# Patient Record
Sex: MALE | Race: BLACK OR AFRICAN AMERICAN | NOT HISPANIC OR LATINO | URBAN - METROPOLITAN AREA
[De-identification: names, ages, dates, MRNs, and addresses within clinical notes are randomized per-mention and may not be internally consistent; named-entity substitution may affect disease eponyms.]

---

## 2022-04-03 ENCOUNTER — EMERGENCY (EMERGENCY)
Facility: HOSPITAL | Age: 27
LOS: 1 days | Discharge: HOME | End: 2022-04-03
Attending: EMERGENCY MEDICINE | Admitting: EMERGENCY MEDICINE
Payer: COMMERCIAL

## 2022-04-03 VITALS
DIASTOLIC BLOOD PRESSURE: 60 MMHG | HEART RATE: 86 BPM | OXYGEN SATURATION: 100 % | SYSTOLIC BLOOD PRESSURE: 156 MMHG | RESPIRATION RATE: 16 BRPM | TEMPERATURE: 98 F

## 2022-04-03 LAB
ALBUMIN SERPL ELPH-MCNC: 4.9 G/DL — SIGNIFICANT CHANGE UP (ref 3.3–5)
ALP SERPL-CCNC: 65 U/L — SIGNIFICANT CHANGE UP (ref 40–120)
ALT FLD-CCNC: 16 U/L — SIGNIFICANT CHANGE UP (ref 4–41)
ANION GAP SERPL CALC-SCNC: 12 MMOL/L — SIGNIFICANT CHANGE UP (ref 7–14)
AST SERPL-CCNC: 18 U/L — SIGNIFICANT CHANGE UP (ref 4–40)
BASOPHILS # BLD AUTO: 0.05 K/UL — SIGNIFICANT CHANGE UP (ref 0–0.2)
BASOPHILS NFR BLD AUTO: 0.6 % — SIGNIFICANT CHANGE UP (ref 0–2)
BILIRUB SERPL-MCNC: 0.6 MG/DL — SIGNIFICANT CHANGE UP (ref 0.2–1.2)
BUN SERPL-MCNC: 20 MG/DL — SIGNIFICANT CHANGE UP (ref 7–23)
CALCIUM SERPL-MCNC: 9.8 MG/DL — SIGNIFICANT CHANGE UP (ref 8.4–10.5)
CHLORIDE SERPL-SCNC: 100 MMOL/L — SIGNIFICANT CHANGE UP (ref 98–107)
CK SERPL-CCNC: 106 U/L — SIGNIFICANT CHANGE UP (ref 30–200)
CO2 SERPL-SCNC: 30 MMOL/L — SIGNIFICANT CHANGE UP (ref 22–31)
CREAT SERPL-MCNC: 1.25 MG/DL — SIGNIFICANT CHANGE UP (ref 0.5–1.3)
CRP SERPL-MCNC: <4 MG/L — SIGNIFICANT CHANGE UP
EGFR: 82 ML/MIN/1.73M2 — SIGNIFICANT CHANGE UP
EOSINOPHIL # BLD AUTO: 0.05 K/UL — SIGNIFICANT CHANGE UP (ref 0–0.5)
EOSINOPHIL NFR BLD AUTO: 0.6 % — SIGNIFICANT CHANGE UP (ref 0–6)
ERYTHROCYTE [SEDIMENTATION RATE] IN BLOOD: 1 MM/HR — SIGNIFICANT CHANGE UP (ref 1–15)
GLUCOSE SERPL-MCNC: 95 MG/DL — SIGNIFICANT CHANGE UP (ref 70–99)
HCT VFR BLD CALC: 45.3 % — SIGNIFICANT CHANGE UP (ref 39–50)
HGB BLD-MCNC: 15 G/DL — SIGNIFICANT CHANGE UP (ref 13–17)
HIV 1+2 AB+HIV1 P24 AG SERPL QL IA: SIGNIFICANT CHANGE UP
IANC: 5.95 K/UL — SIGNIFICANT CHANGE UP (ref 1.8–7.4)
IMM GRANULOCYTES NFR BLD AUTO: 0.4 % — SIGNIFICANT CHANGE UP (ref 0–1.5)
LYMPHOCYTES # BLD AUTO: 1.36 K/UL — SIGNIFICANT CHANGE UP (ref 1–3.3)
LYMPHOCYTES # BLD AUTO: 16.4 % — SIGNIFICANT CHANGE UP (ref 13–44)
MCHC RBC-ENTMCNC: 30.2 PG — SIGNIFICANT CHANGE UP (ref 27–34)
MCHC RBC-ENTMCNC: 33.1 GM/DL — SIGNIFICANT CHANGE UP (ref 32–36)
MCV RBC AUTO: 91.3 FL — SIGNIFICANT CHANGE UP (ref 80–100)
MONOCYTES # BLD AUTO: 0.86 K/UL — SIGNIFICANT CHANGE UP (ref 0–0.9)
MONOCYTES NFR BLD AUTO: 10.4 % — SIGNIFICANT CHANGE UP (ref 2–14)
NEUTROPHILS # BLD AUTO: 5.95 K/UL — SIGNIFICANT CHANGE UP (ref 1.8–7.4)
NEUTROPHILS NFR BLD AUTO: 71.6 % — SIGNIFICANT CHANGE UP (ref 43–77)
NRBC # BLD: 0 /100 WBCS — SIGNIFICANT CHANGE UP
NRBC # FLD: 0 K/UL — SIGNIFICANT CHANGE UP
PLATELET # BLD AUTO: 216 K/UL — SIGNIFICANT CHANGE UP (ref 150–400)
POTASSIUM SERPL-MCNC: 4 MMOL/L — SIGNIFICANT CHANGE UP (ref 3.5–5.3)
POTASSIUM SERPL-SCNC: 4 MMOL/L — SIGNIFICANT CHANGE UP (ref 3.5–5.3)
PROT ?TM UR-MCNC: 8 MG/DL — SIGNIFICANT CHANGE UP
PROT SERPL-MCNC: 7.4 G/DL — SIGNIFICANT CHANGE UP (ref 6–8.3)
PROT SERPL-MCNC: 7.6 G/DL — SIGNIFICANT CHANGE UP (ref 6–8.3)
RBC # BLD: 4.96 M/UL — SIGNIFICANT CHANGE UP (ref 4.2–5.8)
RBC # FLD: 13 % — SIGNIFICANT CHANGE UP (ref 10.3–14.5)
SARS-COV-2 RNA SPEC QL NAA+PROBE: SIGNIFICANT CHANGE UP
SODIUM SERPL-SCNC: 142 MMOL/L — SIGNIFICANT CHANGE UP (ref 135–145)
TSH SERPL-MCNC: 0.86 UIU/ML — SIGNIFICANT CHANGE UP (ref 0.27–4.2)
WBC # BLD: 8.3 K/UL — SIGNIFICANT CHANGE UP (ref 3.8–10.5)
WBC # FLD AUTO: 8.3 K/UL — SIGNIFICANT CHANGE UP (ref 3.8–10.5)

## 2022-04-03 PROCEDURE — G1004: CPT

## 2022-04-03 PROCEDURE — 84166 PROTEIN E-PHORESIS/URINE/CSF: CPT | Mod: 26

## 2022-04-03 PROCEDURE — 70450 CT HEAD/BRAIN W/O DYE: CPT | Mod: 26,MG

## 2022-04-03 PROCEDURE — 99218: CPT

## 2022-04-03 PROCEDURE — 86335 IMMUNFIX E-PHORSIS/URINE/CSF: CPT | Mod: 26

## 2022-04-03 PROCEDURE — 70482 CT ORBIT/EAR/FOSSA W/O&W/DYE: CPT | Mod: 26,MG,59

## 2022-04-03 PROCEDURE — 84165 PROTEIN E-PHORESIS SERUM: CPT | Mod: 26

## 2022-04-03 RX ADMIN — Medication 60 MILLIGRAM(S): at 16:42

## 2022-04-03 NOTE — ED PROVIDER NOTE - NS ED ROS FT
Constitutional:  (-) fever, (-) chills, (-) lethargy  Eyes:  (-) eye pain (-) visual changes  ENMT: (-) nasal discharge, (-) sore throat. (-) neck pain or stiffness +hearing loss   Cardiac: (-) chest pain (-) palpitations  Respiratory:  (-) cough (-) respiratory distress.   GI:  (-) nausea (-) vomiting (-) diarrhea (-) abdominal pain.  :  (-) dysuria (-) frequency (-) burning.  MS:  (-) back pain (-) joint pain.  Neuro:  (-) headache (-) numbness (-) tingling (-) focal weakness  Skin:  (-) rash  Except as documented in the HPI,  all other systems are negative

## 2022-04-03 NOTE — ED ADULT NURSE REASSESSMENT NOTE - NS ED NURSE REASSESS COMMENT FT1
20g iv placed in RAC, labs drawn and sent. covid sent. neuro at bedside for eval. will continue to monitor.

## 2022-04-03 NOTE — ED ADULT NURSE NOTE - BRAND OF COVID-19 VACCINATION
----- Message from Lashay Wilson CMA sent at 3/7/2019  9:50 AM CST -----      ----- Message -----  From: Ivy Reyes RN  Sent: 3/7/2019   9:47 AM  To: , #    A new Nurse Triage encounter of type Health Information has been submitted   for patient Rossy Jesus   Pf[employer] dose 1 and 2

## 2022-04-03 NOTE — ED CDU PROVIDER INITIAL DAY NOTE - MEDICAL DECISION MAKING DETAILS
Patient is a 26 y.o male with no known PMHx who presents to ED w/ sister c/o acute onset of b/l hearing loss x 2 days s/p exposure to loud music while in [place]. Pt returned this AM. Sister helping to provide history. Pt seen and worked up in ED; Labs wnl, CTs normal. Neuro consulted and recs appreciated. Pt transferred to CDU for; MRI's, ENT, labs, vitalsq4, neuro checks and frequent re-evals.

## 2022-04-03 NOTE — CONSULT NOTE ADULT - ATTENDING COMMENTS
Date of service 4/4/2022    In brief, 26 year man presenting with bilateral hearing loss after a recent trip to [facility]. He was exposed to loud music during his stay at  and recalls experiencing b/l tinnitus, followed by a pop and complete hearing loss b/l. He denies any headaches, blurry vision, diplopia or facial drooping. No gait instability or extremity weakness.     MRI brain and IAC w/w/o without any acute abnormalities.     Neuro exam: He was drowsy on exam, but was arousable and cooperative with exam.                       No ptosis, EOMI, PERRL, no facial droop                      B/l hearing loss appreciated- had to communicate with writing                      Motor strength 5/5 UE and LE, sensation intact to light touch in UE and LE                      Gait normal    Ear exam- pain/tenderness on otoscope evaluation b/l (patient kept withdrawing away). Ear wax noted b/l. Right ear tympanic membrane with some hyperemia laterally and small perforation medially.     Imp: B/l hearing loss- suspect this is 2/2 to acoustic trauma from loud music. Exam is notable for tympanic membrane perforation on the right.                                    Recommend ENT consultation for further evaluation and management                                   Patient was started on oral prednisone course for possible idiopathic hearing loss b/l. Defer need to continue prednisone per ENT recommendations. If a true mechanical problem is identified, there may be no indication for steroids.
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
## 2022-04-03 NOTE — ED CDU PROVIDER INITIAL DAY NOTE - NS ED ATTENDING STATEMENT MOD
This was a shared visit with the FAVIOLA. I reviewed and verified the documentation and independently performed the documented:
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
## 2022-04-03 NOTE — ED PROVIDER NOTE - ATTENDING CONTRIBUTION TO CARE
26 year old healthy male with sudden b/l hearing loss.  2 days prior after loud music and vibration exposure. no other complaints.  PE sig for small abrasion in right ear canal, TM look intact.  ct head, neuro cx and possible ent CX.  likely trauma for noise and vibration exposure as opposed to new onset neurologic disorder

## 2022-04-03 NOTE — ED PROVIDER NOTE - PHYSICAL EXAMINATION
CONSTITUTIONAL: well-appearing, in NAD  SKIN: Warm dry, normal skin turgor  HEAD: NCAT  EYES: EOMI, PERRLA, no scleral icterus, conjunctiva pink  ENT: normal pharynx with no erythema or exudate, tm in tact b.l  NECK: Supple; non tender. Full ROM.  CARD: RRR, no murmurs.  RESP: clear to ausculation b/l. No crackles or wheezing.  ABD: soft, non-tender, non-distended, no rebound or guarding.  PSYCH: Cooperative, appropriate. CONSTITUTIONAL: well-appearing, in NAD  SKIN: Warm dry, normal skin turgor  HEAD: NCAT  EYES: EOMI, PERRLA, no scleral icterus, conjunctiva pink  ENT: normal pharynx with no erythema or exudate, tm in tact b.l   NECK: Supple; non tender. Full ROM.  CARD: RRR, no murmurs.  RESP: clear to ausculation b/l. No crackles or wheezing.  ABD: soft, non-tender, non-distended, no rebound or guarding.  PSYCH: Cooperative, appropriate.

## 2022-04-03 NOTE — ED CDU PROVIDER INITIAL DAY NOTE - PHYSICAL EXAMINATION
Vital signs reviewed.   CONSTITUTIONAL:  in no apparent distress. Non-toxic appearing.   HEAD: Normocephalic, atraumatic.  EYES: conjunctiva and sclera WNL.  ENT: normal nose; no rhinorrhea; Refer to ED note for ear exam.   CARD: Normal S1, S2; no murmurs  RESP: Normal chest excursion with respiration; breath sounds clear and equal bilaterally; no wheezes, rhonchi, or rales.  EXT/MS: moves all extremities;   SKIN: Normal for age and race;   NEURO: Awake, alert, oriented x 3, no gross deficits  PSYCH: Normal mood; appropriate affect.
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
## 2022-04-03 NOTE — ED CDU PROVIDER INITIAL DAY NOTE - ATTENDING CONTRIBUTION TO CARE
Patient is a 26 y.o male with no known PMHx who presents to ED w/ sister c/o acute onset of b/l hearing loss x 2 days s/p exposure to loud music.  Pt seen and worked up in ED; Labs wnl, CTs normal. Neuro consulted and recs appreciated. Pt transferred to CDU for; MRI's, ENT, labs, vitalsq4, neuro checks and frequent re-evals.

## 2022-04-03 NOTE — ED PROVIDER NOTE - CLINICAL SUMMARY MEDICAL DECISION MAKING FREE TEXT BOX
Complete hearing loss after loud music, eac ct, neuro consultation Complete hearing loss after loud music, Iac ct, neuro consultation

## 2022-04-04 VITALS
TEMPERATURE: 99 F | DIASTOLIC BLOOD PRESSURE: 65 MMHG | OXYGEN SATURATION: 100 % | HEART RATE: 87 BPM | SYSTOLIC BLOOD PRESSURE: 130 MMHG | RESPIRATION RATE: 16 BRPM

## 2022-04-04 DIAGNOSIS — H91.90 UNSPECIFIED HEARING LOSS, UNSPECIFIED EAR: ICD-10-CM

## 2022-04-04 LAB
HAV IGM SER-ACNC: SIGNIFICANT CHANGE UP
HBV CORE IGM SER-ACNC: SIGNIFICANT CHANGE UP
HBV SURFACE AG SER-ACNC: SIGNIFICANT CHANGE UP
HCV AB S/CO SERPL IA: 0.1 S/CO — SIGNIFICANT CHANGE UP (ref 0–0.99)
HCV AB SERPL-IMP: SIGNIFICANT CHANGE UP
HSV1 IGG SER-ACNC: 1.56 INDEX — HIGH
HSV1 IGG SERPL QL IA: POSITIVE
HSV2 IGG FLD-ACNC: 0.61 INDEX — SIGNIFICANT CHANGE UP
HSV2 IGG SERPL QL IA: NEGATIVE — SIGNIFICANT CHANGE UP
T PALLIDUM AB TITR SER: NEGATIVE — SIGNIFICANT CHANGE UP
T3 SERPL-MCNC: 108 NG/DL — SIGNIFICANT CHANGE UP (ref 80–200)
T4 FREE SERPL-MCNC: 1.6 NG/DL — SIGNIFICANT CHANGE UP (ref 0.9–1.8)

## 2022-04-04 PROCEDURE — 70553 MRI BRAIN STEM W/O & W/DYE: CPT | Mod: 26,MG,76

## 2022-04-04 PROCEDURE — 99217: CPT

## 2022-04-04 PROCEDURE — 99203 OFFICE O/P NEW LOW 30 MIN: CPT

## 2022-04-04 PROCEDURE — G1004: CPT

## 2022-04-04 PROCEDURE — 99204 OFFICE O/P NEW MOD 45 MIN: CPT

## 2022-04-04 RX ORDER — DIAZEPAM 5 MG
5 TABLET ORAL ONCE
Refills: 0 | Status: DISCONTINUED | OUTPATIENT
Start: 2022-04-04 | End: 2022-04-04

## 2022-04-04 RX ADMIN — Medication 5 MILLIGRAM(S): at 01:00

## 2022-04-04 RX ADMIN — Medication 60 MILLIGRAM(S): at 05:43

## 2022-04-04 RX ADMIN — Medication 1 MILLIGRAM(S): at 11:34

## 2022-04-04 NOTE — ED CDU PROVIDER SUBSEQUENT DAY NOTE - ATTENDING CONTRIBUTION TO CARE
Pt sent to CDU for further eval of b/l hearing loss in s/o exposure to loud music. CT and MRIs unrevealing, pt seen by neuro- recommending trial of steroids for possible inflammatory component in s/o recent viral illness. Also awaiting ENT recs- will likely be outpt f/u for audiogram and further management. Pt reporting anxiety, was given valium for MRI. Now complaining of continued anxiety. Requesting antidepressants and benzo. Pt states he used to be prescribed these medications but no longer on them. I informed pt that we do not prescribed anti depressants from the ED or benzodiazepines. Pt offered and accepted an additional dose of benzo while in CDU, however, he would need to contact his doctor for those medications to be re-prescribed. Pt became visibly irritated by this and requesting discharge. Ii nformed patient that we were waiting ENT recs prior to d/c Pt sent to CDU for further eval of b/l hearing loss in s/o exposure to loud music. CT and MRIs unrevealing, pt seen by neuro- recommending trial of steroids for possible inflammatory component in s/o recent viral illness. Also awaiting ENT recs- will likely be outpt f/u for audiogram and further management. Pt reporting anxiety, was given valium for MRI. Now complaining of continued anxiety. Requesting antidepressants and benzo. Pt states he used to be prescribed these medications but no longer on them. I informed pt that we do not prescribed anti depressants from the ED or benzodiazepines. Pt offered and accepted an additional dose of benzo while in CDU, however, he would need to contact his doctor for those medications to be re-prescribed. Pt then requested discharge. I informed patient that we were waiting ENT recs prior to d/c.

## 2022-04-04 NOTE — CONSULT NOTE ADULT - SUBJECTIVE AND OBJECTIVE BOX
CC: B/L sudden hearing loss     communication conducted by writing    HPI: 25yo M no pert PMHx who presented to ED for acute onset bilateral hearing loss after recent travel [place]. Pt explained he traveled Thursday 3/31/2022 to . Friday night he went to a party in which the music was very loud.  Early at the party he noted ringing  in his ears b/l with mild discomfort but no pain. After the music stopped at the party the ringing b/l increased and turned into mild pain. Once the pt stepped outside the ringing became even worse and the pain became excruciating b/l. Pt then took a car that had a loud speaker back. During this time he felt his ears pop b/l and then everything went quiet. Pt reported he flew back Saturday morning. Pt reported a mild headache on the plane. ENT consulted for sudden B/l hearing loss. Pt seen and examined at bedside accompanied by sister. Pt endorsed mild headache and feeling very nervous about the situation. Pt reports a sharp, pressure like pain with radiation into his ear canal, specifically when someone touches his external ears, this occurs B/L. Pt denies facial pain, dizziness, jaw pain, chest pain and shortness of breath.  Pt denies drug use.         PAST MEDICAL & SURGICAL HISTORY:  No pertinent past medical history    No significant past surgical history      Allergies    No Known Allergies    Intolerances      MEDICATIONS  (STANDING):  LORazepam     Tablet 1 milliGRAM(s) Oral once  predniSONE   Tablet 60 milliGRAM(s) Oral daily    MEDICATIONS  (PRN):      Social History:  Pt denied drug use.     Family history: No pertinent family history in first degree relatives    ROS:   ENT: all negative except as noted in HPI   CV: denies palpitations  Pulm: denies SOB, cough, hemoptysis  GI: denies change in appetite, indigestion, n/v  : denies pertinent urinary symptoms, urgency  Neuro: denies numbness/tingling, loss of sensation  Psych: denies anxiety  MS: denies muscle weakness, instability  Heme: denies easy bruising or bleeding  Endo: denies heat/cold intolerance, excessive sweating  Vascular: denies LE edema    Vital Signs Last 24 Hrs  T(C): 36.8 (04 Apr 2022 10:31), Max: 37.1 (03 Apr 2022 20:40)  T(F): 98.2 (04 Apr 2022 10:31), Max: 98.8 (03 Apr 2022 20:40)  HR: 66 (04 Apr 2022 10:31) (64 - 91)  BP: 133/57 (04 Apr 2022 10:31) (114/55 - 156/60)  BP(mean): --  RR: 16 (04 Apr 2022 10:31) (16 - 17)  SpO2: 100% (04 Apr 2022 10:31) (99% - 100%)                          15.0   8.30  )-----------( 216      ( 03 Apr 2022 15:53 )             45.3    04-03    142  |  100  |  20  ----------------------------<  95  4.0   |  30  |  1.25    Ca    9.8      03 Apr 2022 15:53    TPro  7.6  /  Alb  x   /  TBili  x   /  DBili  x   /  AST  x   /  ALT  x   /  AlkPhos  x   04-03       PHYSICAL EXAM:  Gen: NAD, sitting up in bed   Skin: No rashes, bruises, or lesions  Head: Normocephalic, Atraumatic  Face: no edema, erythema, or fluctuance. Parotid glands soft without mass  Eyes: no scleral injection  Ears: Right -  external ear tender to palpation tragus and helix. Ear canal small amount of cerumen noted, TM small perforation noticed upper inner quadrant. No evidence of any fluid drainage. No mastoid tenderness, erythema, or ear bulging            Left - external ear tender to palpation tragus and helix. Ear canal  small amount of cerumen noted TM noted hemotympanum . No evidence of any fluid drainage. No mastoid tenderness, erythema, or ear bulging  Nose: Nares bilaterally patent, no discharge  Mouth: No stridor, no drooling, no trismus.  Mucosa moist, tongue/uvula midline, oropharynx clear  Neck: Flat, supple, no lymphadenopathy, trachea midline, no masses  Lymphatic: No lymphadenopathy  Resp: breathing easily, no stridor  CV: no peripheral edema/cyanosis  GI: nondistended   Peripheral vascular: no JVD or edema  Neuro: facial nerve intact, no facial droop,  PT had limited ability to raise eyebrows B/L. Pt had decreased sensitivity to dull and sharp touch right side of face at level of eyebrow, cheek bone and lip.          CC: B/L sudden hearing loss     communication conducted by writing    HPI: 27yo M no pert PMHx who presented to ED for acute onset bilateral hearing loss after recent travel [place]. Pt explained he traveled Thursday 3/31/2022 to the [place]. Friday night he went to a party in which the music was very loud.  Early at the party he noted ringing  in his ears b/l with mild discomfort but no pain. After the music stopped at the party the ringing b/l increased and turned into mild pain. Once the pt stepped outside the ringing became even worse and the pain became excruciating b/l. Pt then took a car that had a loud speaker back. During this time he felt his ears pop b/l and then everything went quiet. Pt reported he flew back Saturday morning. Pt reported a mild headache on the plane. ENT consulted for sudden B/l hearing loss. Pt seen and examined at bedside accompanied by sister. Pt endorsed mild headache and feeling very nervous about the situation. Pt reports a sharp, pressure like pain with radiation into his ear canal, specifically when someone touches his external ears, this occurs B/L. Pt denies facial pain, dizziness, jaw pain, chest pain and shortness of breath.  Pt denies drug use.         PAST MEDICAL & SURGICAL HISTORY:  No pertinent past medical history    No significant past surgical history      Allergies    No Known Allergies    Intolerances      MEDICATIONS  (STANDING):  LORazepam     Tablet 1 milliGRAM(s) Oral once  predniSONE   Tablet 60 milliGRAM(s) Oral daily    MEDICATIONS  (PRN):      Social History:  Pt denied drug use.     Family history: No pertinent family history in first degree relatives    ROS:   ENT: all negative except as noted in HPI   CV: denies palpitations  Pulm: denies SOB, cough, hemoptysis  GI: denies change in appetite, indigestion, n/v  : denies pertinent urinary symptoms, urgency  Neuro: denies numbness/tingling, loss of sensation  Psych: denies anxiety  MS: denies muscle weakness, instability  Heme: denies easy bruising or bleeding  Endo: denies heat/cold intolerance, excessive sweating  Vascular: denies LE edema    Vital Signs Last 24 Hrs  T(C): 36.8 (04 Apr 2022 10:31), Max: 37.1 (03 Apr 2022 20:40)  T(F): 98.2 (04 Apr 2022 10:31), Max: 98.8 (03 Apr 2022 20:40)  HR: 66 (04 Apr 2022 10:31) (64 - 91)  BP: 133/57 (04 Apr 2022 10:31) (114/55 - 156/60)  BP(mean): --  RR: 16 (04 Apr 2022 10:31) (16 - 17)  SpO2: 100% (04 Apr 2022 10:31) (99% - 100%)                          15.0   8.30  )-----------( 216      ( 03 Apr 2022 15:53 )             45.3    04-03    142  |  100  |  20  ----------------------------<  95  4.0   |  30  |  1.25    Ca    9.8      03 Apr 2022 15:53    TPro  7.6  /  Alb  x   /  TBili  x   /  DBili  x   /  AST  x   /  ALT  x   /  AlkPhos  x   04-03       PHYSICAL EXAM:  Gen: NAD, sitting up in bed   Skin: No rashes, bruises, or lesions  Head: Normocephalic, Atraumatic  Face: no edema, erythema, or fluctuance. Parotid glands soft without mass  Eyes: no scleral injection  Ears: Right -  external ear tender to palpation tragus and helix. Ear canal small amount of cerumen noted, TM small perforation noticed upper inner quadrant. No evidence of any fluid drainage. No mastoid tenderness, erythema, or ear bulging            Left - external ear tender to palpation tragus and helix. Ear canal  small amount of cerumen noted TM noted hemotympanum . No evidence of any fluid drainage. No mastoid tenderness, erythema, or ear bulging  Nose: Nares bilaterally patent, no discharge  Mouth: No stridor, no drooling, no trismus.  Mucosa moist, tongue/uvula midline, oropharynx clear  Neck: Flat, supple, no lymphadenopathy, trachea midline, no masses  Lymphatic: No lymphadenopathy  Resp: breathing easily, no stridor  CV: no peripheral edema/cyanosis  GI: nondistended   Peripheral vascular: no JVD or edema  Neuro: facial nerve intact, no facial droop,  PT had limited ability to raise eyebrows B/L. Pt had decreased sensitivity to dull and sharp touch right side of face at level of eyebrow, cheek bone and lip.          CC: B/L sudden hearing loss     Interview conducted via written communication    HPI: 25yo M no pert PMHx who presented to ED for acute onset bilateral hearing loss after recent travel [place]. Pt explained he traveled Thursday 3/31/2022 to the [place]. Friday night he went to a party in which the music was very loud.  Early at the party he noted ringing  in his ears b/l with mild discomfort but no pain. After the music stopped at the party the ringing b/l increased and turned into mild pain. Once the pt stepped outside the ringing became even worse and the pain became excruciating b/l. Pt then took a car that had a loud speaker back. During this time he felt his ears pop b/l and then everything went quiet. Pt reported he flew back Saturday morning. Pt reported a mild headache on the plane. In the ED MRI ordered, and neurology consulted. ENT consulted for sudden B/l hearing loss. Pt seen and examined at bedside accompanied by sister. Pt endorsed mild headache and feeling very nervous about the situation. Pt reports a sharp, pressure like pain with radiation into his ear canal, specifically when someone touches his external ears, this occurs B/L. Pt denies facial pain, dizziness, jaw pain, chest pain and shortness of breath.   Pt endorses ETOH use. Pt denies drug use.         PAST MEDICAL & SURGICAL HISTORY:  No pertinent past medical history    No significant past surgical history      Allergies    No Known Allergies    Intolerances      MEDICATIONS  (STANDING):  LORazepam     Tablet 1 milliGRAM(s) Oral once  predniSONE   Tablet 60 milliGRAM(s) Oral daily    MEDICATIONS  (PRN):      Social History:   Endorses ETOH use. Pt denied drug use.     Family history: No pertinent family history in first degree relatives    ROS:   ENT: all negative except as noted in HPI   CV: denies palpitations  Pulm: denies SOB, cough, hemoptysis  GI: denies change in appetite, indigestion, n/v  : denies pertinent urinary symptoms, urgency  Neuro: denies numbness/tingling, loss of sensation  Psych: denies anxiety  MS: denies muscle weakness, instability  Heme: denies easy bruising or bleeding  Endo: denies heat/cold intolerance, excessive sweating  Vascular: denies LE edema    Vital Signs Last 24 Hrs  T(C): 36.8 (04 Apr 2022 10:31), Max: 37.1 (03 Apr 2022 20:40)  T(F): 98.2 (04 Apr 2022 10:31), Max: 98.8 (03 Apr 2022 20:40)  HR: 66 (04 Apr 2022 10:31) (64 - 91)  BP: 133/57 (04 Apr 2022 10:31) (114/55 - 156/60)  BP(mean): --  RR: 16 (04 Apr 2022 10:31) (16 - 17)  SpO2: 100% (04 Apr 2022 10:31) (99% - 100%)                          15.0   8.30  )-----------( 216      ( 03 Apr 2022 15:53 )             45.3    04-03    142  |  100  |  20  ----------------------------<  95  4.0   |  30  |  1.25    Ca    9.8      03 Apr 2022 15:53    TPro  7.6  /  Alb  x   /  TBili  x   /  DBili  x   /  AST  x   /  ALT  x   /  AlkPhos  x   04-03       PHYSICAL EXAM:  Gen: NAD, sitting up in bed   Skin: No rashes, bruises, or lesions  Head: Normocephalic, Atraumatic  Face: no edema, erythema, or fluctuance. Parotid glands soft without mass  Eyes: no scleral injection  Ears: Right -  external ear tender to palpation tragus and helix. Ear canal small amount of cerumen noted, TM small perforation noticed upper inner quadrant. No evidence of any fluid drainage. No mastoid tenderness, erythema, or ear bulging            Left - external ear tender to palpation tragus and helix. Ear canal  small amount of cerumen noted TM noted hemotympanum . No evidence of any fluid drainage. No mastoid tenderness, erythema, or ear bulging  Nose: Nares bilaterally patent, no discharge  Mouth: No stridor, no drooling, no trismus.  Mucosa moist, tongue/uvula midline, oropharynx clear  Neck: Flat, supple, no lymphadenopathy, trachea midline, no masses  Lymphatic: No lymphadenopathy  Resp: breathing easily, no stridor  CV: no peripheral edema/cyanosis  GI: nondistended   Peripheral vascular: no JVD or edema  Neuro: facial nerve intact, no facial droop,  PT had limited ability to raise eyebrows B/L. Pt had decreased sensitivity to dull and sharp touch right side of face at level of eyebrow, cheek bone and lip.          CC: B/L sudden hearing loss     Interview conducted via written communication    HPI: 25yo M no pert PMHx who presented to ED for acute onset bilateral hearing loss after recent travel [place]. Pt explained he traveled Thursday 3/31/2022 to the [place]. Friday night he went to a party in which the music was very loud.  Early at the party he noted ringing  in his ears b/l with mild discomfort but no pain. After the music stopped at the party the ringing b/l increased and turned into mild pain. Once the pt stepped outside the ringing became even worse and the pain became excruciating b/l. Pt then took a car that had a loud speaker back. During this time he felt his ears pop b/l and then everything went quiet. Pt reported he flew back Saturday morning. Pt reported a mild headache on the plane. In the ED MRI ordered, and neurology consulted. ENT consulted for sudden B/l hearing loss. Pt seen and examined at bedside accompanied by sister. Pt endorsed mild headache and feeling very nervous about the situation. Pt reports a sharp, pressure like pain with radiation into his ear canal, specifically when someone touches his external ears, this occurs B/L. Pt denies facial pain, dizziness, jaw pain, chest pain and shortness of breath.   Pt endorses ETOH use. Pt denies drug use.         PAST MEDICAL & SURGICAL HISTORY:  No pertinent past medical history    No significant past surgical history      Allergies    No Known Allergies    Intolerances      MEDICATIONS  (STANDING):  LORazepam     Tablet 1 milliGRAM(s) Oral once  predniSONE   Tablet 60 milliGRAM(s) Oral daily    MEDICATIONS  (PRN):      Social History:   Endorses ETOH use. Pt denied drug use.     Family history: No pertinent family history in first degree relatives    ROS:   ENT: all negative except as noted in HPI   CV: denies palpitations  Pulm: denies SOB, cough, hemoptysis  GI: denies change in appetite, indigestion, n/v  : denies pertinent urinary symptoms, urgency  Neuro: denies numbness/tingling, loss of sensation  Psych: denies anxiety  MS: denies muscle weakness, instability  Heme: denies easy bruising or bleeding  Endo: denies heat/cold intolerance, excessive sweating  Vascular: denies LE edema    Vital Signs Last 24 Hrs  T(C): 36.8 (04 Apr 2022 10:31), Max: 37.1 (03 Apr 2022 20:40)  T(F): 98.2 (04 Apr 2022 10:31), Max: 98.8 (03 Apr 2022 20:40)  HR: 66 (04 Apr 2022 10:31) (64 - 91)  BP: 133/57 (04 Apr 2022 10:31) (114/55 - 156/60)  BP(mean): --  RR: 16 (04 Apr 2022 10:31) (16 - 17)  SpO2: 100% (04 Apr 2022 10:31) (99% - 100%)                          15.0   8.30  )-----------( 216      ( 03 Apr 2022 15:53 )             45.3    04-03    142  |  100  |  20  ----------------------------<  95  4.0   |  30  |  1.25    Ca    9.8      03 Apr 2022 15:53    TPro  7.6  /  Alb  x   /  TBili  x   /  DBili  x   /  AST  x   /  ALT  x   /  AlkPhos  x   04-03       PHYSICAL EXAM:  Gen: NAD, sitting up in bed   Skin: No rashes, bruises, or lesions  Head: Normocephalic, Atraumatic  Face: no edema, erythema, or fluctuance. Parotid glands soft without mass  Eyes: no scleral injection  Ears: Right -  external ear tender to palpation tragus and helix. Ear canal small amount of cerumen noted, TM small perforation noticed upper inner quadrant. No evidence of any fluid drainage. No mastoid tenderness, erythema, or ear bulging            Left - external ear tender to palpation tragus and helix. Ear canal  small amount of cerumen noted TM noted hemotympanum . No evidence of any fluid drainage. No mastoid tenderness, erythema, or ear bulging  Nose: Nares bilaterally patent, no discharge  Mouth: No stridor, no drooling, no trismus.  Mucosa moist, tongue/uvula midline, oropharynx clear  Neck: Flat, supple, no lymphadenopathy, trachea midline, no masses  Lymphatic: No lymphadenopathy  Resp: breathing easily, no stridor  CV: no peripheral edema/cyanosis  GI: nondistended   Peripheral vascular: no JVD or edema  Neuro: facial nerve intact, no facial droop,  PT had limited ability to raise eyebrows B/L. Pt had decreased sensitivity to dull and sharp touch right side of face at level of eyebrow, cheek bone and lip.      from: MR IAC w/wo IV Cont (04.04.22 @ 02:36)   INTERPRETATION:  INDICATIONS:  Sudden onset bilateral hearing loss.    TECHNIQUE:  Sagittal T1 weighted images as well as axial T2 weighted,   diffusion and FLAIR images of the brain and thin section T1-weighted and   T2 weighted images through the IACs were obtained before contrast.    Following intravenous gadolinium, thin section axial and coronal fat   suppressed images through the IACs as well as axial T1 weighted images   through the brain were performed.  7.5 cc of Gadavist were administered.    0 cc were discarded.    COMPARISON EXAMINATION:  CT brain and temporal bone 4/3/2022    FINDINGS:  VENTRICLES AND SULCI:  Normal. Cavum septum pellucidum and vergae, a   normal variant.  INTRA-AXIAL:  No mass effect or abnormal signal.  EXTRA-AXIAL:  No mass or collection.  CPA CISTERNS:  Normal.  7TH AND 8TH NERVE COMPLEXES:   Normal.  INNER EAR STRUCTURES:   Normal.  TYMPANOMASTOID CAVITIES:  No abnormal signal.  VISUALIZED SINUSES:  Small left maxillary polyp or retention cyst  VISUALIZED SKULL BASE:   Normal.    IMPRESSION:  No retrocochlear abnormality identified.            from: CT Head No Cont (04.03.22 @ 17:51)   INTERPRETATION:  HISTORY: Acute bilateral hearing loss after loud music.    TECHNIQUE: A noncontrast CT scan of the head was performed followed by a   contrast-enhanced CT scan of the temporal bones.  For the CT head, multiple contiguous axial images were acquired from the   skullbase to the vertex without the administration of intravenous   contrast. Coronal and sagittal reformations were made.  For the temporal bones, axial 0.625 mm images with coronal reformatted   series were performed.    FINDINGS:    CT HEAD:    There is no acute intracranial hemorrhage, extra-axial collection,   vasogenic edema, mass effect, midline shift, central herniation, or   hydrocephalus.    Left maxillary sinus retention cyst/polyp. The mastoid air cells and   middle ear cavities are clear.    The soft tissues of the scalp are unremarkable. The calvarium isintact.    CT TEMPORAL BONE:    The external auditory canals are patent bilaterally.    The malleus, incus, and stapes appear unremarkable. The mastoid air cells   and middle ear cavities are well aerated bilaterally.    The cochlea appear well formed bilaterally, with basal, mid, and apical   turns demonstrated with an intact central bony modiolus.    The vestibules and semicircular canals appear unremarkable.    The vestibular aqueducts are not dilated. The round and oval windows   appear patent.    The course of the facial nerve appears normal bilaterally.    The bony course for the carotid arteries and jugular fossa at the   skullbase appear unremarkable with respect to the middle ear cavities   bilaterally.    The bony internal auditory canals are symmetric in size.      IMPRESSION:    CT HEAD: No acute intracranial hemorrhage, mass effect or midline shift.   Left maxillary sinus retention cyst/polyp.    CT TEMPORAL BONE: No abnormality noted.    --- End of Report ---

## 2022-04-04 NOTE — CONSULT NOTE ADULT - ASSESSMENT
27yo M no pert PMHx who presented to ED for acute onset bilateral hearing loss after recent travel [place]. P 25yo M no pert PMHx who presented to ED for acute onset bilateral hearing loss after recent travel [place].   ED studies (CT and MRI) showed no acute findings. Physical exam revealed right TM perforation and  left  hemotympanum with decreased sensitivity to the left face (eyebrow, cheek bone and lips).

## 2022-04-04 NOTE — ED CDU PROVIDER DISPOSITION NOTE - PATIENT PORTAL LINK FT
You can access the FollowMyHealth Patient Portal offered by [facility] by registering at the following website: [email or web address]. By joining [facility]’s FollowMyHealth portal, you will also be able to view your health information using other applications (apps) compatible with our system.

## 2022-04-04 NOTE — ED CDU PROVIDER SUBSEQUENT DAY NOTE - PROGRESS NOTE DETAILS
Pt reassessed, has been anxious and frustrated throughout the day, still is unable to hear. Has been communicating by writing/by hand. Given ativan w/ some improvement of anxiety. MRI head wnl. Seen by neuro and ENT. Neurology initially recommended steroids, however after ENT assessment, sx likely 2/2 trauma. Per neuro, can hold steroids. Plan to dc patient w/ ENT follow up within 2-3 days, pt to be given ear trauma precautions. Pt seen by ENT attg, does recommend steroids, however states Prednisone 60mg daily is too high of a dose. Recommends Medrol dosepak, pt and mom at bedside aware and ok w/plan.

## 2022-04-04 NOTE — CONSULT NOTE ADULT - PROBLEM SELECTOR RECOMMENDATION 9
- follow up in outpatient ENT clinic 2-3 days audiogram  [address] [facility]   - please call [phone] for an appointment   - TM perforation precautions  - No water in ear, avoid loud sounds and avoid sudden change in pressure   - if symptoms become worse report to the Emergency room   - case discussed with Dr. Eller

## 2022-04-04 NOTE — ED CDU PROVIDER DISPOSITION NOTE - NS ED ATTENDING STATEMENT MOD
This was a shared visit with the FAVIOLA. I reviewed and verified the documentation and independently performed the documented: Attending with

## 2022-04-04 NOTE — CONSULT NOTE ADULT - NS ATTEND AMEND GEN_ALL_CORE FT
- follow up in outpatient ENT clinic 2-3 days audiogram  [address] [facility]   - please call [phone] for an appointment   - TM perforation precautions  - No water in ear, avoid loud sounds and avoid sudden change in pressure   - if symptoms become worse report to the Emergency room     Agree with above statement and plan  Thank you for your referral  Rafita Hope MD

## 2022-04-04 NOTE — ED CDU PROVIDER DISPOSITION NOTE - CLINICAL COURSE
6 y.o male with no known PMHx who presents to ED w/ sister c/o acute onset of b/l hearing loss x 2 days s/p exposure to loud music while in [place]. Pt returned this AM. Sister helping to provide history. Pt seen and worked up in ED; Labs wnl, CT head/iac normal. Neuro consulted and recs appreciated. Pt transferred to CDU for; MRI's, ENT, labs, vitalsq4, neuro checks and frequent re-evals. Pt reassessed, has been anxious and frustrated throughout the day, still is unable to hear. Has been communicating by writing/by hand. Given ativan w/ some improvement of anxiety. MRI head wnl. Seen by neuro and ENT. Neurology initially recommended steroids, however after ENT assessment, sx likely 2/2 trauma. Per neuro, can hold steroids. Plan to dc patient w/ ENT follow up within 2-3 days, pt to be given ear trauma precautions. 6 y.o male with no known PMHx who presents to ED w/ sister c/o acute onset of b/l hearing loss x 2 days s/p exposure to loud music while in [place]. Pt returned this AM. Sister helping to provide history. Pt seen and worked up in ED; Labs wnl, CT head/iac normal. Neuro consulted and recs appreciated. Pt transferred to CDU for; MRI's, ENT, labs, vitalsq4, neuro checks and frequent re-evals. Pt reassessed, has been anxious and frustrated throughout the day, still is unable to hear. Has been communicating by writing/by hand. Given ativan w/ some improvement of anxiety. MRI head wnl. Seen by neuro and ENT. Neurology initially recommended steroids, however after ENT assessment, sx likely 2/2 trauma. Per neuro, can hold steroids. Plan to dc patient w/ ENT follow up within 2-3 days, pt to be given ear trauma precautions.    Pt seen by ENT attg, does recommend steroids, however states Prednisone 60mg daily is too high of a dose. Recommends Medrol dosepak, pt and mom at bedside aware and ok w/plan. 26 y.o male with no known PMHx who presents to ED w/ sister c/o acute onset of b/l hearing loss x 2 days s/p exposure to loud music while in [place]. Pt returned this AM. Sister helping to provide history. Pt seen and worked up in ED; Labs wnl, CT head/iac normal. Neuro consulted and recs appreciated. Pt transferred to CDU for; MRI's, ENT, labs, vitalsq4, neuro checks and frequent re-evals. Pt reassessed, has been anxious and frustrated throughout the day, still is unable to hear. Has been communicating by writing/by hand. Given ativan w/ some improvement of anxiety. MRI head wnl. Seen by neuro and ENT. Neurology initially recommended steroids, however after ENT assessment, sx likely 2/2 trauma. Per neuro, can hold steroids. Plan to dc patient w/ ENT follow up within 2-3 days, pt to be given ear trauma precautions.    Pt seen by ENT attg, does recommend steroids, however states Prednisone 60mg daily is too high of a dose. Recommends Medrol dosepak, pt and mom at bedside aware and ok w/plan.

## 2022-04-04 NOTE — ED CDU PROVIDER DISPOSITION NOTE - NSPTACCESSSVCSAPPTDETAILS_ED_ALL_ED_FT
Patient Education        Constipation: Care Instructions  Your Care Instructions     Constipation means that you have a hard time passing stools (bowel movements). People pass stools from 3 times a day to once every 3 days. What is normal for you may be different. Constipation may occur with pain in the rectum and cramping. The pain may get worse when you try to pass stools. Sometimes there are small amounts of bright red blood on toilet paper or the surface of stools. This is because of enlarged veins near the rectum (hemorrhoids). A few changes in your diet and lifestyle may help you avoid ongoing constipation. Your doctor may also prescribe medicine to help loosen your stool. Some medicines can cause constipation. These include pain medicines and antidepressants. Tell your doctor about all the medicines you take. Your doctor may want to make a medicine change to ease your symptoms. Follow-up care is a key part of your treatment and safety. Be sure to make and go to all appointments, and call your doctor if you are having problems. It's also a good idea to know your test results and keep a list of the medicines you take. How can you care for yourself at home? · Drink plenty of fluids. If you have kidney, heart, or liver disease and have to limit fluids, talk with your doctor before you increase the amount of fluids you drink. · Include high-fiber foods in your diet each day. These include fruits, vegetables, beans, and whole grains. · Get at least 30 minutes of exercise on most days of the week. Walking is a good choice. You also may want to do other activities, such as running, swimming, cycling, or playing tennis or team sports. · Take a fiber supplement, such as Citrucel or Metamucil, every day. Read and follow all instructions on the label. · Schedule time each day for a bowel movement. A daily routine may help. Take your time having your bowel movement.   · Support your feet with a small step stool when you sit on the toilet. This helps flex your hips and places your pelvis in a squatting position. · Your doctor may recommend an over-the-counter laxative to relieve your constipation. Examples are Milk of Magnesia and MiraLax. Read and follow all instructions on the label. Do not use laxatives on a long-term basis. When should you call for help? Call your doctor now or seek immediate medical care if:    · You have new or worse belly pain.     · You have new or worse nausea or vomiting.     · You have blood in your stools. Watch closely for changes in your health, and be sure to contact your doctor if:    · Your constipation is getting worse.     · You do not get better as expected. Where can you learn more? Go to https://[employer]. org and sign in to your [employer] account. Enter 21 [phone] in the [facility] box to learn more about \"Constipation: Care Instructions. \"     If you do not have an account, please click on the \"Sign Up Now\" link. Current as of: October 19, 2020               Content Version: 12.9  © 7077-5280 [employer]. Care instructions adapted under license by [facility] ([facility]). If you have questions about a medical condition or this instruction, always ask your healthcare professional. [address]  any warranty or liability for your use of this information. URGENT FOLLOW UP WITH ENT/AUDIOLOGY FOR HEARING LOSS BILATERALLY    SEEN BY ENT, DR. RIVERA IN THE ED, PLEASE CALL SISTER CARMELO BARBA TO ARRANGE FOLLOW UP.

## 2022-04-04 NOTE — ED CDU PROVIDER DISPOSITION NOTE - NSFOLLOWUPINSTRUCTIONS_ED_ALL_ED_FT
See your primary care doctor within 24-48 hours. Follow up with ENT/audiology within 2-3 days, we will call you to help make an appointment, bring copies of all reports with you. Avoid loud music, getting water in your ears, sudden pressure changes. Return to the ER for worsening symptoms or any other concerns.      An eardrum rupture is a hole (perforation) in the eardrum. The eardrum is a thin, round tissue inside of the ear that separates the ear canal from the middle ear. The eardrum is also called the tympanic membrane. It transfers sound vibrations through small bones in the middle ear to the hearing nerve in the inner ear. It also protects the middle ear from germs. An eardrum rupture can cause pain and hearing loss.      What are the causes?    This condition may be caused by:  •An infection.    •A sudden injury, such as from:  •Inserting a thin, sharp object into the ear.      •A hit to the side of the head, especially by an open hand.      •Falling onto water or a flat surface.      •A rapid change in pressure, such as from flying or scuba diving.      •A sudden increase in pressure against the eardrum, such as from an explosion or a very loud noise.        •Inserting a cotton-tipped swab in the ear.      •A long-term eustachian tube disorder. Eustachian tubes are parts of the body that connect each middle ear space to the back of the nose.      •A medical procedure or surgery, such as a procedure to remove wax from the ear canal.      •Removing a pressure equalization tube(PE tube) that was surgically placed through the eardrum.      •Having a PE tube fall out.        What increases the risk?    You are more likely to develop this condition if:  •You have had PE tubes inserted in your ears.      •You have an ear infection.    •You play sports that:  •Involve balls or contact with other players.      •Take place in water, such as diving, scuba diving, or waterskiing.          What are the signs or symptoms?    Symptoms of this condition include:  •Sudden pain at the time of the injury.      •Ear pain that suddenly improves.      •Ringing in the ear after the injury.      •Drainage from the ear. The drainage may be clear, cloudy or pus-like, or bloody.      •Hearing loss.      •Dizziness.        How is this diagnosed?    This condition is diagnosed based on your symptoms and medical history as well as a physical exam. Your health care provider can usually see a perforation using an ear scope (otoscope). You may have tests, such as:  •A hearing test (audiogram) to check for hearing loss.      •A test in which a sample of ear drainage is tested for infection (culture).        How is this treated?    An eardrum typically heals on its own within a few weeks. If your eardrum does not heal, your health care provider may recommend a procedure to place a patch over your eardrum or surgery to repair your eardrum. Your health care provider may also prescribe antibiotic medicines to help prevent infection.    If the ear heals completely, any hearing loss should be temporary.      Follow these instructions at home:    Medicines     •Take over-the-counter and prescription medicines only as told by your health care provider.      •If you were prescribed an antibiotic medicine, use it as told by your health care provider. Do not stop using the antibiotic even if you start to feel better.      Ear care     •Keep your ear dry. This is very important. Follow instructions from your health care provider about how to keep your ear dry. You may need to wear waterproof earplugs when bathing and swimming.    •If directed, apply heat to your affected ear as often as told by your health care provider. Use the heat source that your health care provider recommends, such as a moist heat pack or a heating pad. This will help to relieve pain.  •Place a towel between your skin and the heat source.      •Leave the heat on for 20–30 minutes.      •Remove the heat if your skin turns bright red. This is especially important if you are unable to feel pain, heat, or cold. You have a greater risk of getting burned.        General instructions     •Return to sports and activities as told by your health care provider. Ask your health care provider what activities are safe for you.      •Wear headgear with ear protection when you play sports in which ear injuries are common.      •Talk to your health care provider before traveling by plane.      •Keep all follow-up visits. This is important.        Contact a health care provider if:    •You have a fever.      •You have ear pain.      •You have mucus or blood draining from your ear.      •You have hearing loss, dizziness, or ringing in your ear.        Get help right away if:    •You have sudden hearing loss.      •You are very dizzy.      •You have severe ear pain.      •Your face feels weak or becomes limp (paralyzed).      These symptoms may represent a serious problem that is an emergency. Do not wait to see if the symptoms will go away. Get medical help right away. Call your local emergency services (911 in the [place] Do not drive yourself to the hospital.       Summary    •An eardrum rupture is a hole (perforation) in the eardrum that can cause pain and hearing loss. It is usually caused by a sudden injury to the ear.      •The eardrum will likely heal on its own within a few weeks. In some cases, surgery may be necessary.      •Follow instructions from your health care provider about how to keep your ear dry as it heals.      This information is not intended to replace advice given to you by your health care provider. Make sure you discuss any questions you have with your health care provider.

## 2022-04-05 PROBLEM — Z78.9 OTHER SPECIFIED HEALTH STATUS: Chronic | Status: ACTIVE | Noted: 2022-04-03

## 2022-04-05 LAB
% ALBUMIN: 57.5 % — SIGNIFICANT CHANGE UP
% ALPHA 1: 2.2 % — SIGNIFICANT CHANGE UP
% ALPHA 2: 11.9 % — SIGNIFICANT CHANGE UP
% BETA: 11.3 % — SIGNIFICANT CHANGE UP
% GAMMA, URINE: SIGNIFICANT CHANGE UP
% GAMMA: 17.2 % — SIGNIFICANT CHANGE UP
ALBUMIN 24H MFR UR ELPH: SIGNIFICANT CHANGE UP
ALBUMIN SERPL ELPH-MCNC: 4.37 G/DL — SIGNIFICANT CHANGE UP (ref 3.3–4.4)
ALBUMIN/GLOB SERPL ELPH: 1.4 RATIO — SIGNIFICANT CHANGE UP
ALPHA1 GLOB 24H MFR UR ELPH: SIGNIFICANT CHANGE UP
ALPHA1 GLOB SERPL ELPH-MCNC: 0.17 G/DL — SIGNIFICANT CHANGE UP (ref 0.1–0.3)
ALPHA2 GLOB 24H MFR UR ELPH: SIGNIFICANT CHANGE UP
ALPHA2 GLOB SERPL ELPH-MCNC: 0.9 G/DL — SIGNIFICANT CHANGE UP (ref 0.6–1)
B-GLOBULIN 24H MFR UR ELPH: SIGNIFICANT CHANGE UP
B-GLOBULIN SERPL ELPH-MCNC: 0.86 G/DL — SIGNIFICANT CHANGE UP (ref 0.6–1.1)
DSDNA AB FLD-ACNC: <0.2 AI — SIGNIFICANT CHANGE UP
ENA SS-A AB FLD IA-ACNC: <0.2 AI — SIGNIFICANT CHANGE UP
GAMMA GLOBULIN: 1.31 G/DL — SIGNIFICANT CHANGE UP (ref 0.7–1.7)
PROT ?TM UR-MCNC: 8 MG/DL — SIGNIFICANT CHANGE UP
PROT PATTERN 24H UR ELPH-IMP: SIGNIFICANT CHANGE UP
PROT PATTERN SERPL ELPH-IMP: SIGNIFICANT CHANGE UP
PROT SERPL-MCNC: 7.6 G/DL — SIGNIFICANT CHANGE UP

## 2022-04-06 PROBLEM — Z00.00 ENCOUNTER FOR PREVENTIVE HEALTH EXAMINATION: Status: ACTIVE | Noted: 2022-04-06

## 2022-04-06 LAB
ACE SERPL-CCNC: 45 U/L — SIGNIFICANT CHANGE UP (ref 14–82)
ANA TITR SER: NEGATIVE — SIGNIFICANT CHANGE UP
AUTO DIFF PNL BLD: NEGATIVE — SIGNIFICANT CHANGE UP
C-ANCA SER-ACNC: NEGATIVE — SIGNIFICANT CHANGE UP
P-ANCA SER-ACNC: NEGATIVE — SIGNIFICANT CHANGE UP

## 2022-04-07 ENCOUNTER — APPOINTMENT (OUTPATIENT)
Dept: OTOLARYNGOLOGY | Facility: CLINIC | Age: 27
End: 2022-04-07
Payer: COMMERCIAL

## 2022-04-07 VITALS
BODY MASS INDEX: 24.5 KG/M2 | DIASTOLIC BLOOD PRESSURE: 80 MMHG | HEART RATE: 64 BPM | SYSTOLIC BLOOD PRESSURE: 120 MMHG | OXYGEN SATURATION: 98 % | TEMPERATURE: 97.2 F | HEIGHT: 69 IN | WEIGHT: 165.38 LBS

## 2022-04-07 DIAGNOSIS — H91.90 UNSPECIFIED HEARING LOSS, UNSPECIFIED EAR: ICD-10-CM

## 2022-04-07 LAB
HSV1 AB FLD QL: NEGATIVE — SIGNIFICANT CHANGE UP
HSV2 AB FLD-ACNC: NEGATIVE — SIGNIFICANT CHANGE UP
INTERPRETATION 24H UR IFE-IMP: SIGNIFICANT CHANGE UP
INTERPRETATION 24H UR IFE-IMP: SIGNIFICANT CHANGE UP

## 2022-04-07 PROCEDURE — 99204 OFFICE O/P NEW MOD 45 MIN: CPT

## 2022-04-07 NOTE — REVIEW OF SYSTEMS
[Ear Pain] : ear pain [Hearing Loss] : hearing loss [Dizziness] : dizziness [Vertigo] : vertigo [Lightheadedness] : lightheadedness [Anxiety] : anxiety [Depression] : depression [Negative] : Heme/Lymph

## 2022-04-07 NOTE — ASSESSMENT
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  Pt to get hearing test and will f/u.

## 2022-04-07 NOTE — CONSULT LETTER
[Dear  ___] : Dear  [unfilled], [Consult Letter:] : I had the pleasure of evaluating your patient, [unfilled]. [Please see my note below.] : Please see my note below. [Consult Closing:] : Thank you very much for allowing me to participate in the care of this patient.  If you have any questions, please do not hesitate to contact me. [Sincerely,] : Sincerely, [FreeTextEntry2] : Amanda Walton MD [FreeTextEntry3] : Bala Fitzgerald MD, FACS\par Chief of Otolaryngology [facility]\par  - Dept. of Otolaryngology\par [facility] [employer]\par \par

## 2022-04-08 LAB
B BURGDOR DNA SPEC QL NAA+PROBE: NEGATIVE — SIGNIFICANT CHANGE UP
CMV DNA CSF QL NAA+PROBE: SIGNIFICANT CHANGE UP
INTERPRETATION 24H UR IFE-IMP: SIGNIFICANT CHANGE UP
M PROTEIN 24H UR ELPH-MRATE: SIGNIFICANT CHANGE UP
M PROTEIN 24H UR ELPH-MRATE: SIGNIFICANT CHANGE UP

## 2022-04-20 ENCOUNTER — APPOINTMENT (OUTPATIENT)
Dept: SPEECH THERAPY | Facility: CLINIC | Age: 27
End: 2022-04-20

## 2025-02-20 NOTE — ED CDU PROVIDER SUBSEQUENT DAY NOTE - DATE/TIME 1
Render Post-Care Instructions In Note?: yes Post-Care Instructions: I reviewed with the patient in detail post-care instructions. Patient is to wear sunprotection, and avoid picking at any of the treated lesions. Pt may apply Vaseline to crusted or scabbing areas. Detail Level: Detailed Render Note In Bullet Format When Appropriate: No Duration Of Freeze Thaw-Cycle (Seconds): 0 Number Of Freeze-Thaw Cycles: 3 freeze-thaw cycles Application Tool (Optional): Liquid Nitrogen Sprayer Consent: The patient's verbal consent was obtained including but not limited to risks of crusting, scabbing, blistering, scarring, darker or lighter pigmentary change, recurrence, incomplete removal and infection. 04-Apr-2022 15:53